# Patient Record
Sex: FEMALE | Race: BLACK OR AFRICAN AMERICAN | Employment: UNEMPLOYED | URBAN - METROPOLITAN AREA
[De-identification: names, ages, dates, MRNs, and addresses within clinical notes are randomized per-mention and may not be internally consistent; named-entity substitution may affect disease eponyms.]

---

## 2017-11-21 ENCOUNTER — OP HISTORICAL/CONVERTED ENCOUNTER (OUTPATIENT)
Dept: OTHER | Age: 37
End: 2017-11-21

## 2020-07-31 ENCOUNTER — OFFICE VISIT (OUTPATIENT)
Dept: ENDOCRINOLOGY | Age: 40
End: 2020-07-31

## 2020-07-31 VITALS
DIASTOLIC BLOOD PRESSURE: 62 MMHG | TEMPERATURE: 97.7 F | OXYGEN SATURATION: 97 % | SYSTOLIC BLOOD PRESSURE: 111 MMHG | HEART RATE: 90 BPM | HEIGHT: 64 IN | WEIGHT: 231 LBS | BODY MASS INDEX: 39.44 KG/M2 | RESPIRATION RATE: 16 BRPM

## 2020-07-31 DIAGNOSIS — E05.90 SUBCLINICAL HYPERTHYROIDISM: Primary | ICD-10-CM

## 2020-07-31 DIAGNOSIS — E66.9 NON MORBID OBESITY: ICD-10-CM

## 2020-07-31 RX ORDER — ASPIRIN 81 MG/1
81 TABLET ORAL AS NEEDED
COMMUNITY

## 2020-07-31 RX ORDER — AMLODIPINE BESYLATE 10 MG/1
TABLET ORAL
COMMUNITY
Start: 2020-05-17

## 2020-07-31 NOTE — LETTER
8/1/20 Patient: Colette Cooley YOB: 1980 Date of Visit: 7/31/2020 Bienvenido Montanez MD 
[address] [address] VIA Facsimile: [phone] Dear Bienvenido Montanez MD, Thank you for referring Ms. Lico Alas to [id number] [place] [address] for evaluation. My notes for this consultation are attached. If you have questions, please do not hesitate to call me. I look forward to following your patient along with you. Sincerely, Lynnette Mims MD

## 2020-07-31 NOTE — PROGRESS NOTES
Kathy Hansen MD      Patient Information  Date:8/1/2020  Name : Adriana Davidson 35 y.o.     YOB: 1980         Referred by: Amber Torres MD         Chief Complaint   Patient presents with   Fior.Minus New Patient     referred for Thyroid       History of present illness    Adriana Davidson is a 35 y.o. female      she is here for evaluation and management of abnormal thyroid function tests. she was found to have low TSH x 2 in June 2020. No prior history of thyroid disease. She might have been on multivitamins, biotin at that time  Intermittently has nervousness, but nothing consistently. No weight loss. No iodine exposure, steroid use, not on any thyroid supplements. No change in the size of the neck or neck pain. No dysphagia,dysphonia or dyspnea. No A fib or osteoporosis  No family history of thyroid disease or thyroid cancer      Past Medical History:   Diagnosis Date    Anxiety     Bipolar disorder (HCC)     Hyperlipidemia     Hypertension        Current Outpatient Medications   Medication Sig    amLODIPine (NORVASC) 10 mg tablet take 1 tablet by mouth once daily    aspirin delayed-release 81 mg tablet Take 81 mg by mouth as needed for Pain. No current facility-administered medications for this visit. Review of Systems:  All 10 systems reviewed and are negative other than mentioned in HPI      Physical Examination:  Blood pressure 111/62, pulse 90, temperature 97.7 °F (36.5 °C), temperature source Oral, resp. rate 16, height 5' 4\" (1.626 m), weight 231 lb (104.8 kg), SpO2 97 %. Body mass index is 39.65 kg/m².   - General: pleasant, no distress, good eye contact  - HEENT: no exopthalmos, no periorbital edema, no scleral/conjunctival injection, EOMI, no lid lag or stare  - Neck: supple, no thyromegaly, nodules, lymph nodes,   - Cardiovascular: regular,  normal S1 and S2, no murmurs  - Respiratory: clear to auscultation bilaterally  - Gastrointestinal: soft, nontender, nondistended, BS +  - Musculoskeletal: no proximal muscle weakness in upper or lower extremities  - Integumentary: no tremors, no edema  - Neurological: alert and oriented   - Psychiatric: normal mood and affect  - Skin - normal turgor    Data Reviewed:       [] Reviewed labs    Assessment/Plan:     1. Subclinical hyperthyroidism    2. Non morbid obesity          she is asymptomatic. Differentials are - Thyroiditis , lab variation , toxic nodule. Clinically could not appreciate goiter. We will repeat thyroid function tests including TSH and free T4 along with T3. No known history of atrial fibrillation, CVD, osteoporosis. Further tests based on blood tests. No indication to treat subclinical hyperthyroidism unless TSH is suppressed , history of atrial fibrillation or has osteoporosis. Nonmorbid obesity: Discussed about the lifestyle changes, dietary changes      Follow-up and Dispositions    · Return in about 6 weeks (around 9/11/2020). Thank you for allowing me to participate in the care of this patient. Haig Kocher, MD      Patient Zunilda Lovett verbalized understanding    Voice-recognition software was used to generate this report, which may result in some phonetic-based errors in the grammar and contents. Even though attempts were made to correct all the mistakes, some may have been missed and remained in the body of the report.

## 2020-07-31 NOTE — PROGRESS NOTES
Taylor Santamaria is a 35 y.o. female here for   Chief Complaint   Patient presents with    New Patient     referred for Thyroid       1. Have you been to the ER, urgent care clinic since your last visit? Hospitalized since your last visit? -n/a    2. Have you seen or consulted any other health care providers outside of the [address] [place] [address] since your last visit?   Include any pap smears or colon screening.-n/a

## 2020-09-04 DIAGNOSIS — E05.90 SUBCLINICAL HYPERTHYROIDISM: ICD-10-CM

## 2021-10-29 ENCOUNTER — TRANSCRIBE ORDER (OUTPATIENT)
Dept: SCHEDULING | Age: 41
End: 2021-10-29

## 2021-10-29 DIAGNOSIS — Z12.31 ENCOUNTER FOR SCREENING MAMMOGRAM FOR MALIGNANT NEOPLASM OF BREAST: Primary | ICD-10-CM

## 2021-11-03 ENCOUNTER — TRANSCRIBE ORDER (OUTPATIENT)
Dept: SCHEDULING | Age: 41
End: 2021-11-03

## 2021-11-03 DIAGNOSIS — Z12.31 ENCOUNTER FOR MAMMOGRAM TO ESTABLISH BASELINE MAMMOGRAM: Primary | ICD-10-CM

## 2021-11-05 ENCOUNTER — HOSPITAL ENCOUNTER (OUTPATIENT)
Dept: MAMMOGRAPHY | Age: 41
Discharge: HOME | End: 2021-11-05
Payer: COMMERCIAL

## 2021-11-05 DIAGNOSIS — Z12.31 ENCOUNTER FOR MAMMOGRAM TO ESTABLISH BASELINE MAMMOGRAM: ICD-10-CM

## 2021-11-05 PROCEDURE — 77063 BREAST TOMOSYNTHESIS BI: CPT

## 2022-11-16 ENCOUNTER — HOSPITAL ENCOUNTER (OUTPATIENT)
Dept: PHYSICAL THERAPY | Age: 42
Discharge: HOME | End: 2022-11-16
Payer: COMMERCIAL

## 2022-11-16 PROCEDURE — 97161 PT EVAL LOW COMPLEX 20 MIN: CPT

## 2022-11-16 NOTE — THERAPY EVALUATION
[address] [address] [facility] [address] [facility], [address] [place] [address]  Ph: [phone]    Fax: [phone]    Initial Evaluation/Plan of Care/Statement of Necessity for Physical Therapy Services     Patient name: Madie Garcia    Date/Start of Care:2022  : 1980  [x]  Patient  Verified  Provider#: [id number]          Referral source: Sydney Dean MD    Return visit to MD: In 30 days from last appt (?22)     Medical/Treatment Diagnosis: Neck pain [M54.2]  Radiculopathy, cervical region [M54.12]    Payor: [address] [place] [address] Road / Plan: [address] / Product Type: Managed Care Medicaid /       Prior Hospitalization: see medical history     Comorbidities: see intake summary   Prior Level of Function: Indep with ADLs/IADLs with off and on R neck pain   Medications: Verified on Patient Summary List          Patient / Family readiness to learn indicated by: asking questions, trying to perform skills, and interest  Persons(s) to be included in education: patient (P)  Barriers to Learning/Limitations: None  Patient Self Reported Health Status: poor  Rehabilitation Potential: excellent  Previous Treatment/Compliance: none   PMHx/Surgical Hx: see intake summary   Work Hx: [employer] joyce   Living Situation: Lives with sister and mom in an apartment on second level. Barriers to progress: none   Motivation: good   Substance use: Tobacco use - h.o. substance abuse   Cognition: A & O x 4   Onset Date: 10/28/22    SUBJECTIVE  Mechanism of Injury/History of Complaint: Pt states she went to the ER around 10/28 because of tingling in the R hand and pain throughout the R arm to the neck. Pt reports she was told that it could be due to the way she was sitting and resting her arm on chairs while playing games on her phone.   Pt was given some pain medication but she did not want to take it because she is taking Methadone and does not like to mix medications (recovering drug addict). Pt states she has not had any of the sxs for about a week now. She saw the Orthopedic on 11/11/22 and was told that she did not need to do anymore follow ups. Pt states she has had those sxs off and on for a while but it had never been as bad as it was recently. She was told to change how she was sitting and sleeping and she thinks that has helped a lot. Area of pain: R neck/UE     Pain Descriptors:  tingling and achiness    Pain Level (0-10 scale): Worst: 5   Least: 0  Things that worsen pain: putting pressure on the R elbow (sitting resting elbow on arm rest)    Things that ease pain: getting the pressure off of the arm; sleeping with new pillow (S-shape)  Objective/Functional Measures including ROM/MMT:   Physical Findings   Ortho:   Posture:  forward head, rounded shoulders   Palpation: TTP R UT/scalenes/pecs   Gait/Functional Mobility:  indep   Gross findings:  tightness grossly in lower cervical spine     Spine: *normal values in ()  CERVICAL                                                ROM                                  Strength   Flexion  50 5   Extension 50 4+   Protraction WNL     Retraction P! R side of neck        R L R L   Lateral Flexion (45) 25 P! On R  34 pulling R UT 4 5   Rotation (90) 65 67 tightness  4+ 4+   Additional comments:     Specific joints:   SHOULDER  - ROM and strength WNL - some tightness felt in the L shoulder with end range abduction and IR behind the back    Strength: L 69 lbs, R 79 lbs       Neurological:   Myotomes - normal   Dermatomes - normal   Reflexes/Sensations- intact to light touch     Special Tests:   +R spurling test   +median nerve test jesus  (-)radial ner test   (-)ulnar nerve test   +R tinnel test elbow       Ampac Score:   5.92%  ASSESSMENT/Changes in Function:   Pt referred to PT services with diagnoses of neck pain and cervical radiculopathy.   Initial sxs have been resolved for about one week now but pt still presents with some soft tissue tightness, decrease cervical ROM, postural changes, decrease scapula stabilization and upper nerve tension (median and ulnar bias). Pt will benefit from skilled services to improve performance with core stabilization and posture, spine flexibility and strength, increased awareness of neuromuscular control of cervical and scapular muscle activation, and to address impairments in order to meet functional goals. Problem List/Impairments: pain affecting function, decrease ROM, decrease strength, decrease activity tolerance, and decrease flexibility/ joint mobility  Treatment Plan may include any combination of the following: Therapeutic exercise, Neuromuscular reeducation, Manual therapy, Therapeutic activity, Electric stim unattended , Ultrasound, and Mechanical traction  Patient/ Caregiver education and instruction: activity modification and exercises  Frequency / Duration: Patient to be seen 1-2 times per week for 8-12 treatments. Certification Period: 11/16/22 - 2/20/23  Patient Goal (s): Vira Joaquin what to do to prevent pain from returning.     [] Met [] Not met [] Partially met   Short Term Goals: To be accomplished in 4-6 treatments. Indep with a HEP to assist in rehab progression. [] Met [] Not met [] Partially met    5-10 deg improvement in cervical lat flexion and rotation to improve neck mobility. [] Met [] Not met [] Partially met   Pt will report no sxs into the R UE for centralization of neck pain. [] Met [] Not met [] Partially met    Long Term Goals: To be accomplished in 8-12 treatments. Pt will be able to perform daily activities without R neck/UE sxs for return to PLOF.  [] Met [] Not met [] Partially met    TODAY'S TREATMENT: EVALUATION completed                  EVALUATION COMPLEXITY (Low, Moderate, High): low  Visit #: 1/8-12  In time:1:15pm   Out time:2:02pm  Total Treatment Time (min): 47   Pain Level (0-10 scale) pre treatment: 0   Pain Level (0-10 scale) post treatment: 0   With   [x] TE   [] TA   [] Neuro   [] SC   [] other: Patient Education: [x] Review HEP  with handout   [] Progressed/Changed HEP based on:   [] positioning   [] body mechanics   [] transfers   [x] heat/ice application    [x] other: Discussed POC        [x]  Plan of care has been reviewed with PTA. The Plan of Care is based on information from the initial evaluation. Kalli Baker, PT, DPT    11/16/2022   ________________________________________________________________________    I certify that the above Therapy Services are being furnished while the patient is under my care. I agree with the treatment plan and certify that this therapy is necessary.     Physician's Signature:_________________________________________________  Date:____________Time: ____________     Radha Richard MD

## 2022-12-07 ENCOUNTER — HOSPITAL ENCOUNTER (OUTPATIENT)
Dept: PHYSICAL THERAPY | Age: 42
Discharge: HOME | End: 2022-12-07
Payer: COMMERCIAL

## 2022-12-07 PROCEDURE — 97110 THERAPEUTIC EXERCISES: CPT

## 2022-12-07 PROCEDURE — 97140 MANUAL THERAPY 1/> REGIONS: CPT

## 2022-12-07 NOTE — PROGRESS NOTES
PT DAILY TREATMENT NOTE     Patient Name: Henry Leroy  Date:2022  : 1980  [x]  Patient  Verified  Payor: [address] [place] [address] Road / Plan: [address] / Product Type: Managed Care Medicaid /    Treatment Area: Neck pain [M54.2]  Radiculopathy, cervical region [M54.12]   Next MD APPT: In    In time:1:05pm    Out time: 2:05pm  Total Treatment Time (min): 60  Visit #: -    SUBJECTIVE    Any medication changes, allergies to medications, adverse drug reactions, diagnosis change, or new procedure performed?:   [x] No    [] Yes (see summary sheet for update)    Pain Level (0-10 scale) pre treatment: 0/10 neck; 1/10 R hand tingling  Pain Level (0-10 scale) post treatment: 0/10    Subjective functional status/changes:   [] No changes reported  Pt states she has had the R hand tingling off and on. Feeling some today but thinks it is from the weather. Pt states she only feels neck pain when sleeping \"the wrong way. \"    OBJECTIVE  Modality rationale: decrease pain and increase tissue extensibility to improve the patients ability to move her head without neck pain      Min Type Additional Details      15 []  Ice     [x]  Heat  []  Ice massage Position: supine   Location:neck      [x] Skin assessment post-treatment:   [x]intact  []redness- no adverse reaction   []redness - adverse reaction:      30 min Therapeutic Exercise:  [x] See flow sheet :   Rationale: increase ROM to improve the patients ability to perform daily tasks without neck pain or R hand parathesia   15 min Manual Therapy: STM lower cervical region     Rationale: decrease pain, increase ROM, increase tissue extensibility, and decrease trigger points to improve the patients ability to perform daily tasks without neck pain or R hand parathesia       With   [x] TE   [] TA   [] Neuro   [] SC   [] other: Patient Education: [x] Review HEP    [] Progressed/Changed HEP based on:   [] positioning   [] body mechanics   [] transfers   [x] Use of heat/ice    [] other:          Other Objective/Functional Measures:Initiated POC in clinic     ASSESSMENT/Changes in Function:   Pt responded well to tx with no pain or tingling at end of session. Noted neural tension and tightness during ulnar nerve glides on R. Soft tissue tightness in Uts, R scalenes and cervical paraspinals. Will continue current POC. Patient will continue to benefit from skilled PT services to modify and progress therapeutic interventions, address functional mobility deficits, address ROM deficits, address strength deficits, analyze and address soft tissue restrictions, analyze and cue movement patterns, and assess and modify postural abnormalities to attain remaining goals. GOALS/Progress towards goals:  Patient Goal (s): learn what to do to prevent pain from returning.     [] Met [] Not met [] Partially met     Short Term Goals: To be accomplished in 4-6 treatments. Indep with a HEP to assist in rehab progression. [] Met [] Not met [] Partially met    5-10 deg improvement in cervical lat flexion and rotation to improve neck mobility. [] Met [] Not met [] Partially met   Pt will report no sxs into the R UE for centralization of neck pain. [] Met [] Not met [] Partially met      Long Term Goals: To be accomplished in 8-12 treatments. Pt will be able to perform daily activities without R neck/UE sxs for return to PLOF.  [] Met [] Not met [] Partially met      PLAN  [x]  Continue plan of care  [x]  Upgrade activities as tolerated       [x]  Update interventions per flow sheet       []  Discharge due to:  []  Other:     Willie Eason, PT, DPT 12/7/2022

## 2022-12-09 ENCOUNTER — HOSPITAL ENCOUNTER (OUTPATIENT)
Dept: PHYSICAL THERAPY | Age: 42
Discharge: HOME | End: 2022-12-09
Payer: COMMERCIAL

## 2022-12-09 PROCEDURE — 97110 THERAPEUTIC EXERCISES: CPT

## 2022-12-09 NOTE — PROGRESS NOTES
PT DAILY TREATMENT NOTE     Patient Name: Pool Sahu  Date:2022  : 1980  [x]  Patient  Verified  Payor: [address] [place] [address] Road / Plan: Nettie Yusuf / Product Type: Managed Care Medicaid /    Treatment Area: Neck pain [M54.2]  Radiculopathy, cervical region [M54.12]   Next MD APPT: In    In time:1:00 pm    Out time:2:02 pm  Total Treatment Time (min): 60 min  Visit #:     SUBJECTIVE    Any medication changes, allergies to medications, adverse drug reactions, diagnosis change, or new procedure performed?:   [x] No    [] Yes (see summary sheet for update)    Pain Level (0-10 scale) pre treatment: 2/10 neck;   Pain Level (0-10 scale) post treatment: 0/10    Subjective functional status/changes:   [] No changes reported  Pt stated she  had chest pain  after last session . Informed her corner stretch can make her sore in her chest area. She reports no N/T in R hand today.         OBJECTIVE  Modality rationale: decrease pain and increase tissue extensibility to improve the patients ability to move her head without neck pain      Min Type Additional Details      15 []  Ice     [x]  Heat  []  Ice massage Position: supine   Location:neck      [x] Skin assessment post-treatment:   [x]intact  []redness- no adverse reaction   []redness - adverse reaction:      45 min Therapeutic Exercise:  [x] See flow sheet :   Rationale: increase ROM to improve the patients ability to perform daily tasks without neck pain or R hand parathesia    min Manual Therapy: STM lower cervical region     Rationale: decrease pain, increase ROM, increase tissue extensibility, and decrease trigger points to improve the patients ability to perform daily tasks without neck pain or R hand parathesia       With   [x] TE   [] TA   [] Neuro   [] SC   [] other: Patient Education: [x] Review HEP    [] Progressed/Changed HEP based on:   [] positioning   [] body mechanics   [] transfers   [] Use of heat/ice    [] other: Other Objective/Functional Measures:Initiated POC in clinic     ASSESSMENT/Changes in Function:   Patient reported no N/T in R UE today pre and post treatment session today. She was able to tolerate new exercises with no complaints of discomfort or pain. Discussed with patient to stay in pain free range and avoid overstretching. Decrease in pain post treatment session. Patient will continue to benefit from skilled PT services to modify and progress therapeutic interventions, address functional mobility deficits, address ROM deficits, address strength deficits, analyze and address soft tissue restrictions, analyze and cue movement patterns, and assess and modify postural abnormalities to attain remaining goals. GOALS/Progress towards goals:  Patient Goal (s): learn what to do to prevent pain from returning.     [] Met [] Not met [] Partially met     Short Term Goals: To be accomplished in 4-6 treatments. Indep with a HEP to assist in rehab progression. [x] Met [] Not met [] Partially met    5-10 deg improvement in cervical lat flexion and rotation to improve neck mobility. [] Met [] Not met [] Partially met   Pt will report no sxs into the R UE for centralization of neck pain. [] Met [] Not met [] Partially met      Long Term Goals: To be accomplished in 8-12 treatments. Pt will be able to perform daily activities without R neck/UE sxs for return to PLOF. [] Met [] Not met [] Partially met      Treatment Plan may include any combination of the following: Therapeutic exercise, Neuromuscular reeducation, Manual therapy, Therapeutic activity, Electric stim unattended , Ultrasound, and Mechanical traction    Frequency / Duration: Patient to be seen 1-2 times per week for 8-12 treatments.     Certification Period: 11/16/22 - 2/20/23    PLAN  [x]  Continue plan of care  [x]  Upgrade activities as tolerated       [x]  Update interventions per flow sheet       []  Discharge due to:  []  Other: Ellen Sacramento, PTA 12/9/2022

## 2022-12-13 ENCOUNTER — HOSPITAL ENCOUNTER (OUTPATIENT)
Dept: PHYSICAL THERAPY | Age: 42
Discharge: HOME | End: 2022-12-13
Payer: COMMERCIAL

## 2022-12-13 PROCEDURE — 97110 THERAPEUTIC EXERCISES: CPT

## 2022-12-13 PROCEDURE — 97140 MANUAL THERAPY 1/> REGIONS: CPT

## 2022-12-13 NOTE — PROGRESS NOTES
PT DAILY TREATMENT NOTE     Patient Name: Pool Sahu  Date:2022  : 1980  [x]  Patient  Verified  Payor: [address] [place] [address] Road / Plan: [address] / Product Type: Managed Care Medicaid /    Treatment Area: Neck pain [M54.2]  Radiculopathy, cervical region [M54.12]   Next MD APPT: In    In time:2:50  pm    Out time:3:50  pm  Total Treatment Time (min): 60 min  Visit #: -    SUBJECTIVE    Any medication changes, allergies to medications, adverse drug reactions, diagnosis change, or new procedure performed?:   [x] No    [] Yes (see summary sheet for update)    Pain Level (0-10 scale) pre treatment: 6/10 neck  Pain Level (0-10 scale) post treatment: 0/10    Subjective functional status/changes:   [] No changes reported  Reports more pain today and she attribute it to the cold weather. Stated her neck and shoulder are hurting.        OBJECTIVE  Modality rationale: decrease pain and increase tissue extensibility to improve the patients ability to move her head without neck pain      Min Type Additional Details      15 []  Ice     [x]  Heat  []  Ice massage Position: supine   Location:neck      [x] Skin assessment post-treatment:   [x]intact  []redness- no adverse reaction   []redness - adverse reaction:      30 min Therapeutic Exercise:  [x] See flow sheet :   Rationale: increase ROM to improve the patients ability to perform daily tasks without neck pain or R hand parathesia   10 min Manual Therapy: STM lower cervical region     Rationale: decrease pain, increase ROM, increase tissue extensibility, and decrease trigger points to improve the patients ability to perform daily tasks without neck pain or R hand parathesia       With   [x] TE   [] TA   [] Neuro   [] SC   [] other: Patient Education: [x] Review HEP    [] Progressed/Changed HEP based on:   [] positioning   [] body mechanics   [] transfers   [] Use of heat/ice    [] other:          Other Objective/Functional Measures:  Reports more pain on R UT/levator scalene area and L shoulder . ASSESSMENT/Changes in Function:   Tolerated session well we focus on AROM and active/passive stretches. Pt presents with more R UT/levator tightness today. We also did some STM to B shoulder/cervical spine and finish up with heat. Pt reports no pain post session. Patient will continue to benefit from skilled PT services to modify and progress therapeutic interventions, address functional mobility deficits, address ROM deficits, address strength deficits, analyze and address soft tissue restrictions, analyze and cue movement patterns, and assess and modify postural abnormalities to attain remaining goals. GOALS/Progress towards goals:  Patient Goal (s): learn what to do to prevent pain from returning.     [] Met [] Not met [] Partially met     Short Term Goals: To be accomplished in 4-6 treatments. Indep with a HEP to assist in rehab progression. [x] Met [] Not met [] Partially met    5-10 deg improvement in cervical lat flexion and rotation to improve neck mobility. [] Met [] Not met [] Partially met   Pt will report no sxs into the R UE for centralization of neck pain. [] Met [] Not met [] Partially met      Long Term Goals: To be accomplished in 8-12 treatments. Pt will be able to perform daily activities without R neck/UE sxs for return to PLOF. [] Met [] Not met [] Partially met      Treatment Plan may include any combination of the following: Therapeutic exercise, Neuromuscular reeducation, Manual therapy, Therapeutic activity, Electric stim unattended , Ultrasound, and Mechanical traction    Frequency / Duration: Patient to be seen 1-2 times per week for 8-12 treatments.     Certification Period: 11/16/22 - 2/20/23    PLAN  [x]  Continue plan of care  [x]  Upgrade activities as tolerated       [x]  Update interventions per flow sheet       []  Discharge due to:  []  Other:     Dc Torres, PT, DPT 12/13/2022

## 2022-12-15 ENCOUNTER — HOSPITAL ENCOUNTER (OUTPATIENT)
Dept: PHYSICAL THERAPY | Age: 42
Discharge: HOME | End: 2022-12-15
Payer: COMMERCIAL

## 2022-12-15 PROCEDURE — 97140 MANUAL THERAPY 1/> REGIONS: CPT | Performed by: PHYSICAL THERAPIST

## 2022-12-15 PROCEDURE — 97110 THERAPEUTIC EXERCISES: CPT | Performed by: PHYSICAL THERAPIST

## 2022-12-15 NOTE — PROGRESS NOTES
PT DAILY TREATMENT NOTE     Patient Name: Alok Court  Date:12/15/2022  : 1980  [x]  Patient  Verified  Payor: [address] [place] [address] Road / Plan: [address] / Product Type: Managed Care Medicaid /    Treatment Area: Neck pain [M54.2]  Radiculopathy, cervical region [M54.12]   Next MD APPT: In    In time:145 pm    Out time: 240  pm  Total Treatment Time (min): 55  Total Timed codes (Min): 39  Visit #: -    SUBJECTIVE    Any medication changes, allergies to medications, adverse drug reactions, diagnosis change, or new procedure performed?:   [x] No    [] Yes (see summary sheet for update)    Pain Level (0-10 scale) pre treatment: 7/10 L UT, 2/10 tingling in hands  Pain Level (0-10 scale) post treatment: 0/10    Subjective functional status/changes:   [] No changes reported  Reports that she has a knot and a lot of pain in L UT since previous PT visit. Unsure if any specific exercises increased pain. Pt admits she's had a lot of stress in life and has not been performing HEP at all.        OBJECTIVE  Modality rationale: decrease pain and increase tissue extensibility to improve the patients ability to move her head without neck pain      Min Type Additional Details      10 []  Ice     [x]  Heat  []  Ice massage Position: supine   Location:neck      [x] Skin assessment post-treatment:   [x]intact  []redness- no adverse reaction   []redness - adverse reaction:      33 min Therapeutic Exercise:  [x] See flow sheet : manual UT, LS, and pectoral stretch in supine   Rationale: increase ROM to improve the patients ability to perform daily tasks without neck pain or R hand parathesia     12 min Manual Therapy: STM L UT/LS, suboccipital release, cervical distraction   Rationale: decrease pain, increase ROM, increase tissue extensibility, and decrease trigger points to improve the patients ability to perform daily tasks without neck pain or R hand parathesia       With   [x] TE   [] TA   [] Neuro   [] SC   [] other: Patient Education: [x] Review HEP    [] Progressed/Changed HEP based on:   [] positioning   [] body mechanics   [] transfers   [] Use of heat/ice    [x] other: provided with updated HEP handout         Other Objective/Functional Measures:  TTP L UT/LS. Noted reduction of tingling in bilateral hands with manual cervical distraction and suboccipital release    ASSESSMENT/Changes in Function:   Pt participated well with skilled PT services noting resolution of symptoms with treatment. Pt education provided on the importance of proper postural alignment and to avoid elevation of shoulder to help reduced UT tension. Patient will continue to benefit from skilled PT services to modify and progress therapeutic interventions, address functional mobility deficits, address ROM deficits, address strength deficits, analyze and address soft tissue restrictions, analyze and cue movement patterns, and assess and modify postural abnormalities to attain remaining goals. GOALS/Progress towards goals:  Patient Goal (s): learn what to do to prevent pain from returning.     [] Met [] Not met [] Partially met     Short Term Goals: To be accomplished in 4-6 treatments. Indep with a HEP to assist in rehab progression. [x] Met [] Not met [] Partially met    5-10 deg improvement in cervical lat flexion and rotation to improve neck mobility. [] Met [] Not met [] Partially met   Pt will report no sxs into the R UE for centralization of neck pain. [] Met [] Not met [] Partially met      Long Term Goals: To be accomplished in 8-12 treatments. Pt will be able to perform daily activities without R neck/UE sxs for return to PLOF.  [] Met [] Not met [] Partially met      Treatment Plan may include any combination of the following: Therapeutic exercise, Neuromuscular reeducation, Manual therapy, Therapeutic activity, Electric stim unattended , Ultrasound, and Mechanical traction    Frequency / Duration: Patient to be seen 1-2 times per week for 8-12 treatments.     Certification Period: 11/16/22 - 2/20/23    PLAN  [x]  Continue plan of care  [x]  Upgrade activities as tolerated       [x]  Update interventions per flow sheet       []  Discharge due to:  []  Other:     Loida Newton, PT 12/15/2022

## 2022-12-19 ENCOUNTER — HOSPITAL ENCOUNTER (OUTPATIENT)
Dept: PHYSICAL THERAPY | Age: 42
Discharge: HOME | End: 2022-12-19
Payer: COMMERCIAL

## 2022-12-19 PROCEDURE — 97110 THERAPEUTIC EXERCISES: CPT

## 2022-12-19 PROCEDURE — 97140 MANUAL THERAPY 1/> REGIONS: CPT

## 2022-12-19 NOTE — PROGRESS NOTES
PT DAILY TREATMENT NOTE     Patient Name: Darleene Primrose  Date:2022  : 1980  [x]  Patient  Verified  Payor: [address] [place] [address] Road / Plan: [address] / Product Type: Managed Care Medicaid /    Treatment Area: Neck pain [M54.2]  Radiculopathy, cervical region [M54.12]   Next MD APPT: In    In time:1:55 pm    Out time: 2:53  pm  Total Treatment Time (min): 55  Total Timed codes (Min): 40  Visit #: -    SUBJECTIVE    Any medication changes, allergies to medications, adverse drug reactions, diagnosis change, or new procedure performed?:   [x] No    [] Yes (see summary sheet for update)    Pain Level (0-10 scale) pre treatment: 5/10 L   Pain Level (0-10 scale) post treatment: 3/10    Subjective functional status/changes:   [] No changes reported  Reports the N/T is not present today, but comes and goes now. She is complaining more of posterior shoulder pain.       OBJECTIVE  Modality rationale: decrease pain and increase tissue extensibility to improve the patients ability to move her head without neck pain      Min Type Additional Details      15 []  Ice     [x]  Heat  []  Ice massage Position: supine   Location:neck      [x] Skin assessment post-treatment:   [x]intact  []redness- no adverse reaction   []redness - adverse reaction:      30 min Therapeutic Exercise:  [x] See flow sheet : manual UT, LS, and pectoral stretch in supine   Rationale: increase ROM to improve the patients ability to perform daily tasks without neck pain or R hand parathesia     10 min Manual Therapy: STM L UT/LS, suboccipital release, cervical distraction   Rationale: decrease pain, increase ROM, increase tissue extensibility, and decrease trigger points to improve the patients ability to perform daily tasks without neck pain or R hand parathesia       With   [x] TE   [] TA   [] Neuro   [] SC   [] other: Patient Education: [x] Review HEP    [] Progressed/Changed HEP based on:   [] positioning [] body mechanics   [] transfers   [] Use of heat/ice    [] other: provided with updated HEP handout         Other Objective/Functional Measures:  No N/T , continued with MT and added Taping to R UT area. ASSESSMENT/Changes in Function: Patient had no N/T today but did have posterior cap pain to R shoulder. Taped R UT today to help decrease pain . Patient reporting N/T intermittent day to day. Will reassess on next visit . Did discuss when and how to remove tape. Also discussed using ice at home should pain increase. Decrease noted post treatment session. Patient will continue to benefit from skilled PT services to modify and progress therapeutic interventions, address functional mobility deficits, address ROM deficits, address strength deficits, analyze and address soft tissue restrictions, analyze and cue movement patterns, and assess and modify postural abnormalities to attain remaining goals. GOALS/Progress towards goals:  Patient Goal (s): learn what to do to prevent pain from returning.     [] Met [] Not met [] Partially met     Short Term Goals: To be accomplished in 4-6 treatments. Indep with a HEP to assist in rehab progression. [x] Met [] Not met [] Partially met    5-10 deg improvement in cervical lat flexion and rotation to improve neck mobility. [] Met [] Not met [] Partially met   Pt will report no sxs into the R UE for centralization of neck pain. [] Met [] Not met [] Partially met      Long Term Goals: To be accomplished in 8-12 treatments. Pt will be able to perform daily activities without R neck/UE sxs for return to PLOF. [] Met [] Not met [] Partially met      Treatment Plan may include any combination of the following: Therapeutic exercise, Neuromuscular reeducation, Manual therapy, Therapeutic activity, Electric stim unattended , Ultrasound, and Mechanical traction    Frequency / Duration: Patient to be seen 1-2 times per week for 8-12 treatments.     Certification Period: 11/16/22 - 2/20/23    PLAN  [x]  Continue plan of care  [x]  Upgrade activities as tolerated       [x]  Update interventions per flow sheet       []  Discharge due to:  []  Other:     Ellen Cheng, PTA 12/19/2022

## 2022-12-21 ENCOUNTER — HOSPITAL ENCOUNTER (OUTPATIENT)
Dept: PHYSICAL THERAPY | Age: 42
Discharge: HOME | End: 2022-12-21
Payer: COMMERCIAL

## 2022-12-21 PROCEDURE — 97110 THERAPEUTIC EXERCISES: CPT

## 2022-12-21 PROCEDURE — 97140 MANUAL THERAPY 1/> REGIONS: CPT

## 2022-12-21 NOTE — PROGRESS NOTES
PT DAILY TREATMENT NOTE     Patient Name: Rajinder Sidhu  Date:2022  : 1980  [x]  Patient  Verified  Payor: [address] [place] [address] Road / Plan: [address] / Product Type: Managed Care Medicaid /    Treatment Area: Neck pain [M54.2]  Radiculopathy, cervical region [M54.12]   Next MD APPT: In    In time:1:58 pm    Out time: 02:48  pm  Total Treatment Time (min): 45 min  Total Timed codes (Min): 30  Visit #: -    SUBJECTIVE    Any medication changes, allergies to medications, adverse drug reactions, diagnosis change, or new procedure performed?:   [x] No    [] Yes (see summary sheet for update)    Pain Level (0-10 scale) pre treatment: 0/10   Pain Level (0-10 scale) post treatment: 0/10    Subjective functional status/changes:   [] No changes reported  Patient came in 15 min late for her appointment and modified her treatment to available time . Reports the N/T is not present today, but comes and goes now. She reports the N/T go down B UE now. She also states her pain has centralized to her cervical spine . She reports 75% improvement with pain at worse 5/10 and best 0/10. Patient did report tape helped with decreasing pain. Patient also stated she has returned to her PLOF with her ADL's.     OBJECTIVE  Modality rationale: decrease pain and increase tissue extensibility to improve the patients ability to move her head without neck pain      Min Type Additional Details      15 []  Ice     [x]  Heat  []  Ice massage Position: supine   Location:neck      [x] Skin assessment post-treatment:   [x]intact  []redness- no adverse reaction   []redness - adverse reaction:      20 min Therapeutic Exercise:  [x] See flow sheet : manual UT, LS, and pectoral stretch in supine   Rationale: increase ROM to improve the patients ability to perform daily tasks without neck pain or R hand parathesia     10 min Manual Therapy: STM L UT/LS, suboccipital release, cervical distraction   Rationale: decrease pain, increase ROM, increase tissue extensibility, and decrease trigger points to improve the patients ability to perform daily tasks without neck pain or R hand parathesia       With   [x] TE   [] TA   [] Neuro   [] SC   [] other: Patient Education: [x] Review HEP    [] Progressed/Changed HEP based on:   [] positioning   [] body mechanics   [] transfers   [] Use of heat/ice    [] other: provided with updated HEP handout         Other Objective/Functional Measures:  Physical Findings   Ortho:   Posture:  forward head, rounded shoulders   Gait/Functional Mobility:  indep      Spine: *normal values in ()  CERVICAL                                                ROM                                  Strength        Flexion  WNL 5   Extension 60 5        R L R L   Lateral Flexion (45) 45 P! 45 4+ 5   Rotation (90) 65 P! 70 5 5   Additional comments:      Specific joints:   SHOULDER                                         AROM   PROM            MMT   R L R L R L   Flexion (180) WNL WNL   5 5   Extension (60)         Abduction (180) WNL WNL   5 5   Adduction (0)         IR (70) WNL WNL   5 5   ER (90) WNL WNL   5 5   Additional comments:      Strength:  R  80 lbs,  L 58 lbs      ( initial L 69 lbs, R 79 lbs )        Pt. R hand dominant                                Ampac Score:  0%  ( initial 5.92% )    ASSESSMENT/Changes in Function:   Patient came in 15 min late for her appointment and modified her treatment to available time . Patient has been seen a total of 7 times since receiving therapy. Reports the N/T is not present today, but comes and goes now. She reports the N/T go down B UE now. She also states her pain has centralized to her cervical spine . She reports 75% improvement with pain at worse 5/10 and best 0/10. Patient did report tape helped with decreasing pain. Patient also stated she has returned to her PLOF with her ADL's. She is progressing well with goals meeting or partially meeting all goals. She still has some restrictions with cervical ROM , especially rotation. The last 2 times coming into therapy she did not have any radicular symptoms. Improvement with AM-Pac and  strength. Patient will continue to benefit from skilled PT services to modify and progress therapeutic interventions, address functional mobility deficits, address ROM deficits, address strength deficits, analyze and address soft tissue restrictions, analyze and cue movement patterns, and assess and modify postural abnormalities to attain remaining goals. GOALS/Progress towards goals:  Patient Goal (s): learn what to do to prevent pain from returning.     [] Met [] Not met [x] Partially met     Short Term Goals: To be accomplished in 4-6 treatments. Indep with a HEP to assist in rehab progression. [x] Met [] Not met [] Partially met    5-10 deg improvement in cervical lat flexion and rotation to improve neck mobility. [] Met [] Not met [x] Partially met 12/21 Lateral flexion improved  Pt will report no sxs into the R UE for centralization of neck pain. [] Met [] Not met [x] Partially met  12/21 intermittent to B UE    Long Term Goals: To be accomplished in 8-12 treatments. Pt will be able to perform daily activities without R neck/UE sxs for return to PLOF. [x] Met [] Not met [] Partially met      Treatment Plan may include any combination of the following: Therapeutic exercise, Neuromuscular reeducation, Manual therapy, Therapeutic activity, Electric stim unattended , Ultrasound, and Mechanical traction    Frequency / Duration: Patient to be seen 1-2 times per week for 8-12 treatments.     Certification Period: 11/16/22 - 2/20/23    PLAN  [x]  Continue plan of care  [x]  Upgrade activities as tolerated       [x]  Update interventions per flow sheet       []  Discharge due to:  []  Other:     Ellen [place], PTA 12/21/2022

## 2022-12-21 NOTE — PROGRESS NOTES
[address]  Ph: [phone]    Fax: [phone]  Therapy Progress Report  Name: Candelario Yap  : 1980   MD: Tamiko Myles MD     Medical Diagnosis: Neck pain [M54.2]  Radiculopathy, cervical region [M54.12]  Start of Care: 22    Visits from Start of Care: 7     Missed Visits: -  Certification Period: 22 - 22    Frequency/Duration: 2 times a week for 8-12 treatments     Summary of Care/Goals:  ASSESSMENT/Changes in Function:   Patient has been seen a total of 7 times since receiving therapy. Reports the N/T is not present today, but comes and goes now. She reports the N/T go down B UE now. She also states her pain has centralized to her cervical spine . She reports 75% improvement with pain at worse 5/10 and best 0/10. Patient did report tape helped with decreasing pain. Patient also stated she has returned to her PLOF with her ADL's. She is progressing well with goals meeting or partially meeting all goals. She still has some restrictions with cervical ROM , especially rotation. The last 2 times coming into therapy she did not have any radicular symptoms. Improvement with AM-Pac and  strength. Patient will continue to benefit from skilled PT services to modify and progress therapeutic interventions, address functional mobility deficits, address ROM deficits, address strength deficits, analyze and address soft tissue restrictions, analyze and cue movement patterns, and assess and modify postural abnormalities to attain remaining goals. GOALS/Progress towards goals:  Patient Goal (s): learn what to do to prevent pain from returning.     [] Met [] Not met [x] Partially met      Short Term Goals: To be accomplished in 4-6 treatments. Indep with a HEP to assist in rehab progression.  [x] Met [] Not met [] Partially met    5-10 deg improvement in cervical lat flexion and rotation to improve neck mobility. [] Met [] Not met [x] Partially met 12/21 Lateral flexion improved  Pt will report no sxs into the R UE for centralization of neck pain. [] Met [] Not met [x] Partially met  12/21 intermittent to B UE     Long Term Goals: To be accomplished in 8-12 treatments. Pt will be able to perform daily activities without R neck/UE sxs for return to PLOF. [x] Met [] Not met [] Partially met     _____________________________________________       SUBJECTIVE     Any medication changes, allergies to medications, adverse drug reactions, diagnosis change, or new procedure performed?:   [x] No    [] Yes (see summary sheet for update)     Pain Level (0-10 scale) pre treatment: 0/10   Pain Level (0-10 scale) post treatment: 0/10     Subjective functional status/changes:   [] No changes reported  Patient came in 15 min late for her appointment and modified her treatment to available time . Reports the N/T is not present today, but comes and goes now. She reports the N/T go down B UE now. She also states her pain has centralized to her cervical spine . She reports 75% improvement with pain at worse 5/10 and best 0/10. Patient did report tape helped with decreasing pain. Patient also stated she has returned to her PLOF with her ADL's.      OBJECTIVE                              Other Objective/Functional Measures:  Physical Findings   Ortho:   Posture:  forward head, rounded shoulders   Gait/Functional Mobility:  indep      Spine: *normal values in ()  CERVICAL                                                ROM                                  Strength        Flexion  WNL 5   Extension 60 5        R L R L   Lateral Flexion (45) 45 P! 45 4+ 5   Rotation (90) 65 P! 70 5 5   Additional comments:      Specific joints:   SHOULDER                                         AROM                        PROM                       MMT    R L R L R L   Flexion (180) WNL WNL     5 5   Extension (60)               Abduction (180) WNL WNL     5 5   Adduction (0)               IR (70) WNL WNL     5 5   ER (90) WNL WNL     5 5   Additional comments:       Strength:  R  80 lbs,  L 58 lbs      ( initial L 69 lbs, R 79 lbs )        Pt. R hand dominant                                 Ampac Score:  0%  ( initial 5.92% )  Recommendations: continue current POC   [x]  Plan of care has been reviewed with PTA. A physical therapist assistant contributed to this documentation. Kalli Carrillo, PT, DPT 12/21/2022     ________________________________________________________________________     Please retain this original for your records.

## 2022-12-27 ENCOUNTER — HOSPITAL ENCOUNTER (OUTPATIENT)
Dept: PHYSICAL THERAPY | Age: 42
End: 2022-12-27
Payer: COMMERCIAL

## 2022-12-27 PROCEDURE — 97140 MANUAL THERAPY 1/> REGIONS: CPT

## 2022-12-27 PROCEDURE — 97110 THERAPEUTIC EXERCISES: CPT

## 2022-12-27 NOTE — PROGRESS NOTES
PT DAILY TREATMENT NOTE     Patient Name: Azael Bowen  Date:2022  : 1980  [x]  Patient  Verified  Payor: [address] [place] [address] Road / Plan: [address] / Product Type: Managed Care Medicaid /    Treatment Area: Neck pain [M54.2]  Radiculopathy, cervical region [M54.12]   Next MD APPT: In    In time:10:45am    Out time: 11:30am  Total Treatment Time (min): 45  Total Timed codes (Min): 45  Visit #: -    SUBJECTIVE    Any medication changes, allergies to medications, adverse drug reactions, diagnosis change, or new procedure performed?:   [x] No    [] Yes (see summary sheet for update)    Pain Level (0-10 scale) pre treatment: 0/10    Pain Level (0-10 scale) post treatment: 3/10    Subjective functional status/changes:   [] No changes reported  Reports the N/T is not present today.  No pain reports      OBJECTIVE  Modality rationale: decrease pain and increase tissue extensibility to improve the patients ability to move her head without neck pain      Min Type Additional Details      10 []  Ice     [x]  Heat simultaneous with MT  []  Ice massage Position: supine   Location:neck      [x] Skin assessment post-treatment:   [x]intact  []redness- no adverse reaction   []redness - adverse reaction:      35 min Therapeutic Exercise:  [x] See flow sheet : 3 way ISOmetric cs retraction, PNF stretch during sh shrugs, manual UT, LS, and pectoral stretch in supine   Rationale: increase ROM to improve the patients ability to perform daily tasks without neck pain or R hand parathesia     10 min Manual Therapy: STM L UT/LS, suboccipital release, PNF stretch during sh shrugs, manual UT, LS, and pectoral stretch in supine   Rationale: decrease pain, increase ROM, increase tissue extensibility, and decrease trigger points to improve the patients ability to perform daily tasks without neck pain or R hand parathesia       With   [x] TE   [] TA   [] Neuro   [] SC   [] other: Patient Education: [x] Review HEP    [] Progressed/Changed HEP based on:   [] positioning   [] body mechanics   [] transfers   [] Use of heat/ice    [] other: provided with updated HEP handout         Other Objective/Functional Measures:  PNF contract relax no pain noted. ASSESSMENT/Changes in Function:   Pt presented good PROM lateral flexion and cs rotation however AROM still presents retricted ROM. Decrease noted post treatment session. Patient will continue to benefit from skilled PT services to modify and progress therapeutic interventions, address functional mobility deficits, address ROM deficits, address strength deficits, analyze and address soft tissue restrictions, analyze and cue movement patterns, and assess and modify postural abnormalities to attain remaining goals. GOALS/Progress towards goals:  Patient Goal (s): learn what to do to prevent pain from returning.     [] Met [] Not met [] Partially met     Short Term Goals: To be accomplished in 4-6 treatments. Indep with a HEP to assist in rehab progression. [x] Met [] Not met [] Partially met    5-10 deg improvement in cervical lat flexion and rotation to improve neck mobility. [] Met [] Not met [] Partially met   Pt will report no sxs into the R UE for centralization of neck pain. [] Met [] Not met [] Partially met      Long Term Goals: To be accomplished in 8-12 treatments. Pt will be able to perform daily activities without R neck/UE sxs for return to PLOF. [] Met [] Not met [] Partially met      Treatment Plan may include any combination of the following: Therapeutic exercise, Neuromuscular reeducation, Manual therapy, Therapeutic activity, Electric stim unattended , Ultrasound, and Mechanical traction    Frequency / Duration: Patient to be seen 1-2 times per week for 8-12 treatments.     Certification Period: 11/16/22 - 2/20/23    PLAN  [x]  Continue plan of care  [x]  Upgrade activities as tolerated       [x]  Update interventions per flow sheet       []  Discharge due to:  []  Other:     Doroteo De Anda, PTA 12/27/2022

## 2022-12-29 ENCOUNTER — HOSPITAL ENCOUNTER (OUTPATIENT)
Dept: PHYSICAL THERAPY | Age: 42
End: 2022-12-29
Payer: COMMERCIAL

## 2022-12-29 PROCEDURE — 97140 MANUAL THERAPY 1/> REGIONS: CPT

## 2022-12-29 PROCEDURE — 97110 THERAPEUTIC EXERCISES: CPT

## 2022-12-29 NOTE — PROGRESS NOTES
PT DAILY TREATMENT NOTE     Patient Name: Cammy Vazquez  Date:2022  : 1980  [x]  Patient  Verified  Payor: [address] [place] [address] Road / Plan: [address] / Product Type: Managed Care Medicaid /    Treatment Area: Neck pain [M54.2]  Radiculopathy, cervical region [M54.12]   Next MD APPT: In    In time:10:15am    Out time: 11:03am  Total Treatment Time (min): 48  Total Timed codes (Min): 48  Visit #:     SUBJECTIVE    Any medication changes, allergies to medications, adverse drug reactions, diagnosis change, or new procedure performed?:   [x] No    [] Yes (see summary sheet for update)    Pain Level (0-10 scale) pre treatment: 0/10    Pain Level (0-10 scale) post treatment: 0/10    Subjective functional status/changes:   [] No changes reported  Reports no NT today but index and pinky still \"comes and goes\"      OBJECTIVE  Modality rationale: decrease pain and increase tissue extensibility to improve the patients ability to move her head without neck pain      Min Type Additional Details       []  Ice     [x]  Heat   []  Ice massage Position: supine   Location:neck      [] Skin assessment post-treatment:   [x]intact  []redness- no adverse reaction   []redness - adverse reaction:      38 min Therapeutic Exercise:  [x] See flow sheet : 3 way ISOmetric cs retraction,   Rationale: increase ROM to improve the patients ability to perform daily tasks without neck pain or R hand parathesia     10 min Manual Therapy: suboccipital release, cs distraction [id number], SCM manual stretch   Rationale: decrease pain, increase ROM, increase tissue extensibility, and decrease trigger points to improve the patients ability to perform daily tasks without neck pain or R hand parathesia       With   [] TE   [] TA   [] Neuro   [] SC   [] other: Patient Education: [] Review HEP    [] Progressed/Changed HEP based on:   [] positioning   [] body mechanics   [] transfers   [] Use of heat/ice    [] other: Other Objective/Functional Measures:      ASSESSMENT/Changes in Function:   Pt tolerated TE without increase in pain. Pt was able to tolerate added scapular TE without pain increase. During lateral cs retraction pt noted some lightheaded symptoms to which subsided after a min of rest and was able to complete exercise. During MT distraction to occipital and cs, noted tightness on SCM. Pt noted improvement of tightness after STM to SCM. Patient will continue to benefit from skilled PT services to modify and progress therapeutic interventions, address functional mobility deficits, address ROM deficits, address strength deficits, analyze and address soft tissue restrictions, analyze and cue movement patterns, and assess and modify postural abnormalities to attain remaining goals. GOALS/Progress towards goals:  Patient Goal (s): learn what to do to prevent pain from returning.     [] Met [] Not met [] Partially met     Short Term Goals: To be accomplished in 4-6 treatments. Indep with a HEP to assist in rehab progression. [x] Met [] Not met [] Partially met    5-10 deg improvement in cervical lat flexion and rotation to improve neck mobility. [] Met [] Not met [] Partially met   Pt will report no sxs into the R UE for centralization of neck pain. [] Met [] Not met [] Partially met      Long Term Goals: To be accomplished in 8-12 treatments. Pt will be able to perform daily activities without R neck/UE sxs for return to PLOF. [] Met [] Not met [] Partially met      Treatment Plan may include any combination of the following: Therapeutic exercise, Neuromuscular reeducation, Manual therapy, Therapeutic activity, Electric stim unattended , Ultrasound, and Mechanical traction    Frequency / Duration: Patient to be seen 1-2 times per week for 8-12 treatments.     Certification Period: 11/16/22 - 2/20/23    PLAN  [x]  Continue plan of care  [x]  Upgrade activities as tolerated       [x]  Update interventions per flow sheet       []  Discharge due to:  []  Other:     Preethi Castillo, PTA 12/29/2022

## 2023-01-04 ENCOUNTER — HOSPITAL ENCOUNTER (OUTPATIENT)
Dept: PHYSICAL THERAPY | Age: 43
End: 2023-01-04
Payer: COMMERCIAL

## 2023-01-04 PROCEDURE — 97140 MANUAL THERAPY 1/> REGIONS: CPT

## 2023-01-04 PROCEDURE — 97110 THERAPEUTIC EXERCISES: CPT

## 2023-01-04 NOTE — PROGRESS NOTES
PT DAILY TREATMENT NOTE     Patient Name: Alina Hall  Date:2023  : 1980  [x]  Patient  Verified  Payor: [address] [place] [address] Road / Plan: [address] / Product Type: Managed Care Medicaid /    Treatment Area: Neck pain [M54.2]  Radiculopathy, cervical region [M54.12]   Next MD APPT: In    In time: 9:45am    Out time: 10:40am  Total Treatment Time (min): 55  Total Timed codes (Min): 45  Visit #: 10/8-12    SUBJECTIVE    Any medication changes, allergies to medications, adverse drug reactions, diagnosis change, or new procedure performed?:   [x] No    [] Yes (see summary sheet for update)    Pain Level (0-10 scale) pre treatment: 5/10  N&T in R hand, 3/10 c/s R>L  Pain Level (0-10 scale) post treatment: 1/10 both areas    Subjective functional status/changes:   [] No changes reported  Pt reports increased N&T in the R hand this date and R side c/s pain. Reports she has been packing things at home and increased stress.        OBJECTIVE  Modality rationale: decrease pain and increase tissue extensibility to improve the patients ability to move her head without neck pain      Min Type Additional Details      10 []  Ice     [x]  Heat   []  Ice massage Position: supine   Location:neck      [] Skin assessment post-treatment:   [x]intact  []redness- no adverse reaction   []redness - adverse reaction:      35 min Therapeutic Exercise:  [x] See flow sheet : 3 way ISOmetric cs retraction,   Rationale: increase ROM to improve the patients ability to perform daily tasks without neck pain or R hand parathesia     10 min Manual Therapy: suboccipital release, cs distraction [id number], SCM manual stretch, manual pec stretching   Rationale: decrease pain, increase ROM, increase tissue extensibility, and decrease trigger points to improve the patients ability to perform daily tasks without neck pain or R hand parathesia       With   [x] TE   [] TA   [] Neuro   [] SC   [] other: Patient Education: [] Review HEP    [] Progressed/Changed HEP based on:   [] positioning   [] body mechanics   [] transfers   [] Use of heat/ice    [] other:          Other Objective/Functional Measures:      ASSESSMENT/Changes in Function:   Pt tolerated session well with no exacerbation of symptoms. Continues to present with hypertonicity of SCM and upper traps addressed with manual tx. Pt with reduction in pain and radicular symptoms in R hand post session. Plan to continue per POC as tolerated. Patient will continue to benefit from skilled PT services to modify and progress therapeutic interventions, address functional mobility deficits, address ROM deficits, address strength deficits, analyze and address soft tissue restrictions, analyze and cue movement patterns, and assess and modify postural abnormalities to attain remaining goals. GOALS/Progress towards goals:  Patient Goal (s): learn what to do to prevent pain from returning.     [] Met [] Not met [] Partially met     Short Term Goals: To be accomplished in 4-6 treatments. Indep with a HEP to assist in rehab progression. [x] Met [] Not met [] Partially met    5-10 deg improvement in cervical lat flexion and rotation to improve neck mobility. [] Met [] Not met [] Partially met   Pt will report no sxs into the R UE for centralization of neck pain. [] Met [] Not met [] Partially met      Long Term Goals: To be accomplished in 8-12 treatments. Pt will be able to perform daily activities without R neck/UE sxs for return to PLOF. [] Met [] Not met [] Partially met      Treatment Plan may include any combination of the following: Therapeutic exercise, Neuromuscular reeducation, Manual therapy, Therapeutic activity, Electric stim unattended , Ultrasound, and Mechanical traction    Frequency / Duration: Patient to be seen 1-2 times per week for 8-12 treatments.     Certification Period: 11/16/22 - 2/20/23    PLAN  [x]  Continue plan of care  [x]  Upgrade activities as tolerated       [x]  Update interventions per flow sheet       []  Discharge due to:  []  Other:     Blanca Reyna, YOLY 1/4/2023

## 2023-01-06 ENCOUNTER — HOSPITAL ENCOUNTER (OUTPATIENT)
Dept: PHYSICAL THERAPY | Age: 43
End: 2023-01-06
Payer: COMMERCIAL

## 2023-01-06 PROCEDURE — 97140 MANUAL THERAPY 1/> REGIONS: CPT

## 2023-01-06 PROCEDURE — 97110 THERAPEUTIC EXERCISES: CPT

## 2023-01-06 NOTE — PROGRESS NOTES
PT DAILY TREATMENT NOTE     Patient Name: Nino Polanco  Date:2023  : 1980  [x]  Patient  Verified  Payor: [address] [place] [address] Road / Plan: [address] / Product Type: Managed Care Medicaid /    Treatment Area: Neck pain [M54.2]  Radiculopathy, cervical region [M54.12]   Next MD APPT: In  or   In time: 11:45am    Out time: 12:31pm  Total Treatment Time (min): 45 min  Total Timed codes (Min): 30 min  Visit #: -    SUBJECTIVE    Any medication changes, allergies to medications, adverse drug reactions, diagnosis change, or new procedure performed?:   [x] No    [] Yes (see summary sheet for update)    Pain Level (0-10 scale) pre treatment: 3/10  N&T in R hand, 3/10 c/s R>L  Pain Level (0-10 scale) post treatment: 0/10 both areas, no radicular symptoms    Subjective functional status/changes:   [] No changes reported  Pt came in 15 minutes late for today's appointment. She stated she still has radicular symptoms that are intermittent, 3 days. She reports the tape did help but she has sensitive skin . \"It makes my skin red  but it helps. \"      OBJECTIVE     NO Estim or US  Modality rationale: decrease pain and increase tissue extensibility to improve the patients ability to move her head without neck pain      Min Type Additional Details      15 []  Ice     [x]  Heat   []  Ice massage Position: supine   Location:neck      [] Skin assessment post-treatment:   [x]intact  []redness- no adverse reaction   []redness - adverse reaction:      15 min Therapeutic Exercise:  [x] See flow sheet : 3 way ISOmetric cs retraction,   Rationale: increase ROM to improve the patients ability to perform daily tasks without neck pain or R hand parathesia     15 min Manual Therapy: suboccipital release, cs distraction SCM manual stretch,    Rationale: decrease pain, increase ROM, increase tissue extensibility, and decrease trigger points to improve the patients ability to perform daily tasks without neck pain or R hand parathesia       With   [x] TE   [] TA   [] Neuro   [] SC   [] other: Patient Education: [] Review HEP    [] Progressed/Changed HEP based on:   [] positioning   [] body mechanics   [] transfers   [] Use of heat/ice    [] other:          Other Objective/Functional Measures:      ASSESSMENT/Changes in Function: Patient reporting manual traction helps decrease radicular symptoms. She is very TTP along R UT and SCM. Applied tape to R UT due to patient reporting relief with first taping. No pain or radicular symptoms post treatment session. Patient will continue to benefit from skilled PT services to modify and progress therapeutic interventions, address functional mobility deficits, address ROM deficits, address strength deficits, analyze and address soft tissue restrictions, analyze and cue movement patterns, and assess and modify postural abnormalities to attain remaining goals. GOALS/Progress towards goals:  Patient Goal (s): learn what to do to prevent pain from returning.     [] Met [] Not met [] Partially met     Short Term Goals: To be accomplished in 4-6 treatments. Indep with a HEP to assist in rehab progression. [x] Met [] Not met [] Partially met    5-10 deg improvement in cervical lat flexion and rotation to improve neck mobility. [] Met [] Not met [] Partially met   Pt will report no sxs into the R UE for centralization of neck pain. [] Met [x] Not met [] Partially met      Long Term Goals: To be accomplished in 8-12 treatments. Pt will be able to perform daily activities without R neck/UE sxs for return to PLOF. [] Met [] Not met [] Partially met      Treatment Plan may include any combination of the following: Therapeutic exercise, Neuromuscular reeducation, Manual therapy, Therapeutic activity, Electric stim unattended , Ultrasound, and Mechanical traction    Frequency / Duration: Patient to be seen 1-2 times per week for 8-12 treatments.     Certification Period: 11/16/22 - 2/20/23    PLAN  [x]  Continue plan of care  [x]  Upgrade activities as tolerated       [x]  Update interventions per flow sheet       []  Discharge due to:  []  Other:     Marifer Green PTA 1/6/2023

## 2023-01-09 ENCOUNTER — APPOINTMENT (OUTPATIENT)
Dept: PHYSICAL THERAPY | Age: 43
End: 2023-01-09
Payer: COMMERCIAL

## 2023-01-09 PROCEDURE — 97012 MECHANICAL TRACTION THERAPY: CPT

## 2023-01-09 PROCEDURE — 97110 THERAPEUTIC EXERCISES: CPT

## 2023-01-09 NOTE — PROGRESS NOTES
PT DAILY TREATMENT NOTE     Patient Name: Felicity Nielsen  Date:2023  : 1980  [x]  Patient  Verified  [facility] / Plan: [address] / Product Type: Managed Care Medicaid /    Treatment Area: Neck pain [M54.2]  Radiculopathy, cervical region [M54.12]   Next MD APPT: In January   In time: 10:42am  Out time: 11:38am  Total Treatment Time (min): 56  Total Timed codes (Min): 36  Visit #:     SUBJECTIVE    Any medication changes, allergies to medications, adverse drug reactions, diagnosis change, or new procedure performed?:   [x] No    [] Yes (see summary sheet for update)    Pain Level (0-10 scale) pre treatment: 3/10  N&T in R hand, 2-3/10 c/s R>L  Pain Level (0-10 scale) post treatment: 1/10 both areas, no radicular symptoms    Subjective functional status/changes:   [] No changes reported  Pt reports 50-75% improvement since starting therapy. Pt reporting soreness in the UT (R>L) and some R hand tingling this morning. Pt states the tingling comes and goes depending on how she is sitting and using the R arm. Pt states she has had some of the tingling in the L hand also. Pt states she has not been consistent with HEP because she is in the process of moving and has a lot going on.         OBJECTIVE     NO Estim or US  Modality rationale: decrease pain and increase tissue extensibility to improve the patients ability to perform daily activities without neck/hand sxs   Min Type Additional Details    [] Estim: []UnAtt   []Att       []TENS instruct                  []IFC  []Premod   []NMES                    []Other:  []w/US   []w/ice   []w/heat  Position:  Location:    []  Ice     []  Heat  []  Ice massage Position:  Location:   20 [x]  Traction: [x] Cervical       []Lumbar                       [] Prone          [x]Supine                       []Intermittent   []Continuous Lbs: 15  [x]w/heat  []W/heat and Estim    []  Ultrasound: []Continuous   [] Pulsed at:                           []1MHz   []3MHz Location:  W/cm2:      [x] Skin assessment post-treatment:  [x]intact  []redness- no adverse reaction       []redness - adverse reaction:      36 min Therapeutic Exercise:  [x] See flow sheet : reassessment    Rationale: increase ROM to improve the patients ability to perform daily tasks without neck pain or R hand parathesia      min Manual Therapy: suboccipital release, cs distraction SCM manual stretch,    Rationale: decrease pain, increase ROM, increase tissue extensibility, and decrease trigger points to improve the patients ability to perform daily tasks without neck pain or R hand parathesia       With   [x] TE   [] TA   [] Neuro   [] SC   [] other: Patient Education: [] Review HEP    [] Progressed/Changed HEP based on:   [] positioning   [] body mechanics   [] transfers   [] Use of heat/ice    [] other:          Other Objective/Functional Measures:  Spine: *normal values in ()  CERVICAL                                                ROM                                  Strength        Flexion  48    Extension 54    Protraction       Retraction           R L R L   Lateral Flexion (45) 34  P!  40     Rotation (90) 67 70     Additional comments: No c/o parathesia in hands with cervical ROM       Strength: L 83 lbs, R 55 lbs       After traction: L 64 lbs, R 83 lbs (no parathesia)      Special Tests:   +L spurling test with numbness into the L hand   +median nerve test jesus  +R tinnel test elbow        ASSESSMENT/Changes in Function: Pt responded well to tx mechanical traction with less neck pain and significant improvement in R  strength. Pt continues to have signs of neural tension in jesus UEs (R>L). Also noted lower cervical myofascial tightness. Pt will benefit from continued PT services for further centralization of sxs and pain reduction.   She has Pt will benefit from skilled services to improve performance with core stabilization and posture, spine flexibility and strength, increased awareness of neuromuscular control of cervical and scapular muscle activation, and to address impairments in order to meet functional goals. GOALS/Progress towards goals:  Patient Goal (s): learn what to do to prevent pain from returning.     [] Met [] Not met [x] Partially met     Short Term Goals: To be accomplished in 4-6 treatments. Indep with a HEP to assist in rehab progression. [x] Met [] Not met [] Partially met    5-10 deg improvement in cervical lat flexion and rotation to improve neck mobility. [] Met [] Not met [x] Partially met 1/9 met for lat flexion and improved with rotation   Pt will report no sxs into the R UE for centralization of neck pain. [] Met [] Not met [x] Partially met  1/9 less but still present     Long Term Goals: To be accomplished in 8-12 treatments. Pt will be able to perform daily activities without R neck/UE sxs for return to PLOF. [] Met [] Not met [x] Partially met  1/9 50% improved    Treatment Plan may include any combination of the following: Therapeutic exercise, Neuromuscular reeducation, Manual therapy, Therapeutic activity, Electric stim unattended , Ultrasound, and Mechanical traction    Frequency / Duration: Patient to be seen 1-2 times per week for 8-12 treatments.     Certification Period: 11/16/22 - 2/20/23    PLAN  [x]  Continue plan of care  [x]  Upgrade activities as tolerated       [x]  Update interventions per flow sheet       []  Discharge due to:  []  Other:     Brandt Horne, PT, DPT 1/9/2023

## 2023-01-09 NOTE — THERAPY RECERTIFICATION
[address] [address] [facility] [address] [facility]  Ph: [phone]    Fax: [phone]  Plan of Care  Name: Ute Beavers  : 1980   MD: Conrado Councilman, MD     Medical/Treatment Diagnosis: Neck pain [M54.2]  Radiculopathy, cervical region [M54.12]     Problem List/Impairments: pain affecting function, decrease ROM, decrease strength, decrease ADL/ functional abilitiies, decrease activity tolerance, and decrease flexibility/ joint mobility    Start of Care: 22   Visits from Start of Care: 12  Missed Visits: -  Certification Period: 1/10/23 - 3/10/23    Frequency/Duration: 2 times a week for 8 treatments   Treatment Plan may include any combination of the following: Therapeutic exercise, Neuromuscular reeducation, Manual therapy, Therapeutic activity, Self care/home management, Electric stim unattended , Ultrasound, and Mechanical traction  [x]  Plan of care has been reviewed with PTA. Patient/ Caregiver education and instruction: activity modification and exercises    Summary of Care/Goals:    ASSESSMENT/Changes in Function: Pt responded well to tx mechanical traction with less neck pain and significant improvement in R  strength. Pt continues to have signs of neural tension in jesus UEs (R>L). Also noted lower cervical myofascial tightness. Pt will benefit from continued PT services for further centralization of sxs and pain reduction. She has Pt will benefit from skilled services to improve performance with core stabilization and posture, spine flexibility and strength, increased awareness of neuromuscular control of cervical and scapular muscle activation, and to address impairments in order to meet functional goals. GOALS/Progress towards goals:  Patient Goal (s): learn what to do to prevent pain from returning.     [] Met [] Not met [x] Partially met      Short Term Goals: To be accomplished in 4-6 treatments.   Indep with a HEP to assist in rehab progression. [x] Met [] Not met [] Partially met    5-10 deg improvement in cervical lat flexion and rotation to improve neck mobility. [] Met [] Not met [x] Partially met 1/9 met for lat flexion and improved with rotation   Pt will report no sxs into the R UE for centralization of neck pain. [] Met [] Not met [x] Partially met  1/9 less but still present      Long Term Goals: To be accomplished in 8-12 treatments. Pt will be able to perform daily activities without R neck/UE sxs for return to PLOF. [] Met [] Not met [x] Partially met  1/9 50% improved    __________________________________________      SUBJECTIVE     Any medication changes, allergies to medications, adverse drug reactions, diagnosis change, or new procedure performed?:   [x] No    [] Yes (see summary sheet for update)     Pain Level (0-10 scale) pre treatment: 3/10  N&T in R hand, 2-3/10 c/s  Pain Level (0-10 scale) post treatment: 1/10 both areas, no radicular symptoms     Subjective functional status/changes:   [] No changes reported  Pt reports 50-75% improvement since starting therapy. Pt reporting soreness in the UT (R>L) and some R hand tingling this morning. Pt states the tingling comes and goes depending on how she is sitting and using the R arm. Pt states she has had some of the tingling in the L hand also. Pt states she has not been consistent with HEP because she is in the process of moving and has a lot going on.           OBJECTIVE                                              Other Objective/Functional Measures:  Spine: *normal values in ()  CERVICAL                                                ROM                                  Strength        Flexion  48     Extension 54     Protraction       Retraction            R L R L   Lateral Flexion (45) 34  P!  40       Rotation (90) 67 70       Additional comments: No c/o parathesia in hands with cervical ROM       Strength: L 83 lbs, R 55 lbs       After traction: L 64 lbs, R 83 lbs (no parathesia)      Special Tests:   +L spurling test with numbness into the L hand   +median nerve test jesus  +R tinnel test elbow         Ampac Score:  0%  Recommendations: continue current POC - trial mechanical traction     Kalli Jacob Sigifredo, PT, DPT 1/9/2023     Retain this original for your records. If you are unable to process this request in 24 hours, please contact our office.   ________________________________________________________________________  NOTE TO PHYSICIAN:  Please complete the following and fax to: Kellen Li :  Fax: [phone]   ____ I have read the above report and request that my patient continue therapy. ____ I have read the above report and request that my patient continue therapy with the following changes/special instructions:    ____ I have read the above report and request that my patient be discharged from therapy.     Physician's Signature:_______________________________________________ Date:_____________Time:____________      Issac Lopez MD

## 2023-01-19 ENCOUNTER — HOSPITAL ENCOUNTER (OUTPATIENT)
Dept: PHYSICAL THERAPY | Age: 43
Discharge: HOME | End: 2023-01-19
Payer: COMMERCIAL

## 2023-01-19 PROCEDURE — 97110 THERAPEUTIC EXERCISES: CPT

## 2023-01-19 NOTE — PROGRESS NOTES
PT DAILY TREATMENT NOTE     Patient Name: Jeimy Alfred  Date:2023  : 1980  [x]  Patient  Verified  Payor: [address] [place] [address] Road / Plan: [address] / Product Type: Managed Care Medicaid /    Treatment Area: Neck pain [M54.2]  Radiculopathy, cervical region [M54.12]   Next MD APPT: In January   In time: 10:00 am  Out time: 10:20 am  Total Treatment Time (min): 15 min  Total Timed codes (Min): 15 min  Visit #: 1/10     SUBJECTIVE    Any medication changes, allergies to medications, adverse drug reactions, diagnosis change, or new procedure performed?:   [x] No    [] Yes (see summary sheet for update)    Pain Level (0-10 scale) pre treatment: 0/10 pain  N/T in both hands  Pain Level (0-10 scale) post treatment: 0/10 N/T in both hands    Subjective functional status/changes:   [] No changes reported  Pt stated she did not know they changed her appointment times. \"I can't stay because I have another appointment at 10:30\" Patient stated she is moving and has not had time to listen to her voicemail. \"I was going to leave but thought I should stay. \" Patient reports she has no pain but has N/T in both hands. \"I don't know if that is from moving or not.  \"        OBJECTIVE     NO Estim or US  Modality rationale: decrease pain and increase tissue extensibility to improve the patients ability to perform daily activities without neck/hand sxs   Min Type Additional Details    [] Estim: []UnAtt   []Att       []TENS instruct                  []IFC  []Premod   []NMES                    []Other:  []w/US   []w/ice   []w/heat  Position:  Location:    []  Ice     []  Heat  []  Ice massage Position:  Location:    []  Traction: [] Cervical       []Lumbar                       [] Prone          []Supine                       []Intermittent   []Continuous Lbs: 15  []w/heat  []W/heat and Estim    []  Ultrasound: []Continuous   [] Pulsed at:                           []1MHz   []3MHz Location:  W/cm2: [] Skin assessment post-treatment:  []intact  []redness- no adverse reaction       []redness - adverse reaction:      15 min Therapeutic Exercise:  [x] See flow sheet : reassessment    Rationale: increase ROM to improve the patients ability to perform daily tasks without neck pain or R hand parathesia      min Manual Therapy: suboccipital release, cs distraction SCM manual stretch,    Rationale: decrease pain, increase ROM, increase tissue extensibility, and decrease trigger points to improve the patients ability to perform daily tasks without neck pain or R hand parathesia       With   [x] TE   [] TA   [] Neuro   [] SC   [] other: Patient Education: [] Review HEP    [] Progressed/Changed HEP based on:   [] positioning   [] body mechanics   [] transfers   [] Use of heat/ice    [] other:          Other Objective/Functional Measures:  Treatment modified and cut short per patient request.       ASSESSMENT/Changes in Function: Pt's treatment time was changed and patient was not aware. She is in the process of moving and did not receive the message. She has another appointment that she has to go to and requested to leave. She is complaining of N/T in both hands now and unsure if due to moving. No changes pre or post treatment session. Pt will benefit from continued PT services for further centralization of sxs and pain reduction. She has Pt will benefit from skilled services to improve performance with core stabilization and posture, spine flexibility and strength, increased awareness of neuromuscular control of cervical and scapular muscle activation, and to address impairments in order to meet functional goals. GOALS/Progress towards goals:  Patient Goal (s): learn what to do to prevent pain from returning.     [] Met [] Not met [x] Partially met     Short Term Goals: To be accomplished in 4-6 treatments. Indep with a HEP to assist in rehab progression.  [x] Met [] Not met [] Partially met    5-10 deg improvement in cervical lat flexion and rotation to improve neck mobility. [] Met [] Not met [x] Partially met 1/9 met for lat flexion and improved with rotation   Pt will report no sxs into the R UE for centralization of neck pain. [] Met [] Not met [x] Partially met  1/9 less but still present     Long Term Goals: To be accomplished in 8-12 treatments. Pt will be able to perform daily activities without R neck/UE sxs for return to PLOF. [] Met [] Not met [x] Partially met  1/9 50% improved    Treatment Plan may include any combination of the following: Therapeutic exercise, Neuromuscular reeducation, Manual therapy, Therapeutic activity, Electric stim unattended , Ultrasound, and Mechanical traction    Frequency / Duration: Patient to be seen 1-2 times per week for 8-12 treatments.     Certification Period: 11/16/22 - 2/20/23    PLAN  [x]  Continue plan of care  [x]  Upgrade activities as tolerated       [x]  Update interventions per flow sheet       []  Discharge due to:  []  Other:     Raffy Patel PTA 1/19/2023

## 2023-01-24 ENCOUNTER — APPOINTMENT (OUTPATIENT)
Dept: PHYSICAL THERAPY | Age: 43
End: 2023-01-24
Payer: COMMERCIAL

## 2023-01-26 ENCOUNTER — APPOINTMENT (OUTPATIENT)
Dept: PHYSICAL THERAPY | Age: 43
End: 2023-01-26
Payer: COMMERCIAL

## 2023-02-07 ENCOUNTER — APPOINTMENT (OUTPATIENT)
Dept: PHYSICAL THERAPY | Age: 43
End: 2023-02-07
Payer: COMMERCIAL

## 2023-02-09 ENCOUNTER — HOSPITAL ENCOUNTER (OUTPATIENT)
Dept: PHYSICAL THERAPY | Age: 43
Discharge: HOME | End: 2023-02-09
Payer: COMMERCIAL

## 2023-02-09 PROCEDURE — 97140 MANUAL THERAPY 1/> REGIONS: CPT

## 2023-02-09 PROCEDURE — 97110 THERAPEUTIC EXERCISES: CPT

## 2023-02-09 NOTE — PROGRESS NOTES
PT DAILY TREATMENT NOTE     Patient Name: Verenice Thorne  Date:2023  : 1980  [x]  Patient  Verified  Payor: [address] [place] [address] Road / Plan: [address] / Product Type: Managed Care Medicaid /    Treatment Area: Neck pain [M54.2]  Radiculopathy, cervical region [M54.12]   Next MD APPT: In January   In time: 10:00 am  Out time: 10:50 am  Total Treatment Time (min): 50 min  Total Timed codes (Min): 50 min  Visit #: 210     SUBJECTIVE    Any medication changes, allergies to medications, adverse drug reactions, diagnosis change, or new procedure performed?:   [x] No    [] Yes (see summary sheet for update)    Pain Level (0-10 scale) pre treatment: 4/10 pain ,  9/10 N/T in both hands  Pain Level (0-10 scale) post treatment: 3/10 , no N/T in both hands    Subjective functional status/changes:   [] No changes reported  Pt returns after being absent for over 1 month. Patient stated she has been under a lot of stress and has been moving. She has not been seen in therapy since last reassessment. She saw MD  and he wanted her to continue for therapy.  She states she is now having numbness and tingling down bilateral UE.    OBJECTIVE     NO Estim or US      35 min Therapeutic Exercise:  [x] See flow sheet : reassessment    Rationale: increase ROM to improve the patients ability to perform daily tasks without neck pain or R hand parathesia     15 min Manual Therapy: suboccipital release, cs distraction SCM manual stretch, Taped R UT   Rationale: decrease pain, increase ROM, increase tissue extensibility, and decrease trigger points to improve the patients ability to perform daily tasks without neck pain or R hand parathesia       With   [x] TE   [] TA   [] Neuro   [] SC   [] other: Patient Education: [] Review HEP    [] Progressed/Changed HEP based on:   [] positioning   [] body mechanics   [] transfers   [] Use of heat/ice    [x] other: Reassessed         Other Objective/Functional Measures:  Spine:     CERVICAL                                                ROM                                  Strength   Flexion  60    Extension 60       R L R L   Lateral Flexion 45 45     Rotation 60 45     Additional comments: Pain on R      Am-Pac: 0%    ASSESSMENT/Changes in Function: She is complaining of N/T in both hands now and unsure if due to moving. Patient returns to therapy after 1 month and after last reassessment. No changes with AM-Pac rating of 0 % or goals. She does get relief with MT and taping of R UT. She did have a follow up appointment with MD on 2/6 and he wanted her to continue with therapy. No N/T following manual traction and taping. Patient complaining of cervical pain R > L. Pt will benefit from continued PT services for further centralization of sxs and pain reduction. She has Pt will benefit from skilled services to improve performance with core stabilization and posture, spine flexibility and strength, increased awareness of neuromuscular control of cervical and scapular muscle activation, and to address impairments in order to meet functional goals. GOALS/Progress towards goals:  Patient Goal (s): learn what to do to prevent pain from returning.     [] Met [] Not met [x] Partially met     Short Term Goals: To be accomplished in 4-6 treatments. Indep with a HEP to assist in rehab progression. [x] Met [] Not met [] Partially met    5-10 deg improvement in cervical lat flexion and rotation to improve neck mobility. [] Met [] Not met [x] Partially met 1/9 met for lat flexion and improved with rotation   Pt will report no sxs into the R UE for centralization of neck pain. [] Met [x] Not met [] Partially met  1/9 less but still present 2/9 now in both UE    Long Term Goals: To be accomplished in 8-12 treatments. Pt will be able to perform daily activities without R neck/UE sxs for return to PLOF.  [] Met [] Not met [x] Partially met  1/9 50% improved    Treatment Plan may include any combination of the following: Therapeutic exercise, Neuromuscular reeducation, Manual therapy, Therapeutic activity, Electric stim unattended , Ultrasound, and Mechanical traction    Frequency / Duration: Patient to be seen 1-2 times per week for 8-12 treatments.     Certification Period: 11/16/22 - 2/20/23    PLAN  [x]  Continue plan of care  [x]  Upgrade activities as tolerated       [x]  Update interventions per flow sheet       []  Discharge due to:  []  Other:     Anisa Lamb, PTA 2/9/2023

## 2023-02-10 NOTE — PROGRESS NOTES
[address] [address] [facility] [address] [facility], [address] [place] [address]  Ph: [phone]    Fax: [phone]  Therapy Progress Report  Name: Edmundo Primrose  : 1980   MD: Marcy Tomas MD     Medical Diagnosis: Neck pain [M54.2]  Radiculopathy, cervical region [M54.12]  Start of Care: 22    Visits from Start of Care: 14     Missed Visits: 2  Certification Period: 1/10/23 - 3/10/23    Frequency/Duration: 2 times a week for 8 treatments     Summary of Care/Goals:  ASSESSMENT/Changes in Function:   Pt has only been seen two times since her last assessment. She has missed time due to personal reasons. She is now experiencing N/T in both hands but unsure if this is due to moving or something else. She has mild cervical ROM limitations but significant soft tissue restrictions. She does get relief with MT and taping of R UT. No changes with AM-Pac rating of 0 % or goals. She did have a follow up appointment with MD on  and he wanted her to continue with therapy. Pt will benefit from continued PT services for further centralization of sxs and pain reduction. She has Pt will benefit from skilled services to improve performance with core stabilization and posture, spine flexibility and strength, increased awareness of neuromuscular control of cervical and scapular muscle activation, and to address impairments in order to meet functional goals. GOALS/Progress towards goals:  Patient Goal (s): learn what to do to prevent pain from returning.     [] Met [] Not met [x] Partially met      Short Term Goals: To be accomplished in 4-6 treatments. Indep with a HEP to assist in rehab progression. [x] Met [] Not met [] Partially met    5-10 deg improvement in cervical lat flexion and rotation to improve neck mobility.  [] Met [] Not met [x] Partially met 1 met for lat flexion and improved with rotation   Pt will report no sxs into the R UE for centralization of neck pain. [] Met [x] Not met [] Partially met 2/9 now in both UE     Long Term Goals: To be accomplished in 8-12 treatments. Pt will be able to perform daily activities without R neck/UE sxs for return to PLOF. [] Met [] Not met [x] Partially met  1/9 50% improved    __________________________________________       SUBJECTIVE     Any medication changes, allergies to medications, adverse drug reactions, diagnosis change, or new procedure performed?:   [x] No    [] Yes (see summary sheet for update)     Pain Level (0-10 scale) pre treatment: 4/10 pain ,  9/10 N/T in both hands  Pain Level (0-10 scale) post treatment: 3/10 , no N/T in both hands     Subjective functional status/changes:   [] No changes reported  Pt returns after being absent for over 1 month. Patient stated she has been under a lot of stress and has been moving. She has not been seen in therapy since last reassessment. She saw MD 2/6 and he wanted her to continue for therapy. She states she is now having numbness and tingling down bilateral UE.     OBJECTIVE                               Other Objective/Functional Measures:  Spine:      CERVICAL                                                ROM                                  Strength        Flexion  60     Extension 60          R L R L   Lateral Flexion 45 45       Rotation 60 45       Additional comments: Pain on R      Am-Pac: 0%  Recommendations: continue current POC     A physical therapist assistant contributed to this documentation. [x]  Plan of care has been reviewed with PTA. Kalli Arnold, PT, DPT 2/10/2023     ________________________________________________________________________     Please retain this original for your records.

## 2023-02-14 ENCOUNTER — HOSPITAL ENCOUNTER (OUTPATIENT)
Dept: PHYSICAL THERAPY | Age: 43
End: 2023-02-14
Payer: COMMERCIAL

## 2023-02-14 PROCEDURE — 97110 THERAPEUTIC EXERCISES: CPT

## 2023-02-14 PROCEDURE — 97140 MANUAL THERAPY 1/> REGIONS: CPT

## 2023-02-14 NOTE — PROGRESS NOTES
PT DAILY TREATMENT NOTE     Patient Name: Joel Ordonez  Date:2023  : 1980  [x]  Patient  Verified  Payor: [address] [place] [address] Road / Plan: [address] / Product Type: Managed Care Medicaid /    Treatment Area: Neck pain [M54.2]  Radiculopathy, cervical region [M54.12]   Next MD APPT: In January   In time: 10:05 am Out time: 10:45 am  Total Treatment Time (min): 40 min  Total Timed codes (Min): 40 min  Visit #: 3/10     SUBJECTIVE    Any medication changes, allergies to medications, adverse drug reactions, diagnosis change, or new procedure performed?:   [x] No    [] Yes (see summary sheet for update)    Pain Level (0-10 scale) pre treatment: 0/10 pain ,  mild/10 N/T in both hands  Pain Level (0-10 scale) post treatment: 0/10 , no N/T in both hands    Subjective functional status/changes:   [] No changes reported  Pt states she does not have any pain but still has N/T. \"It's not going all the way down my arm now. It's stopping at my elbow. \" She also complains of pain to the R side of her jaw. OBJECTIVE     NO Estim or US    30 min Therapeutic Exercise:  [x] See flow sheet : reassessment    Rationale: increase ROM to improve the patients ability to perform daily tasks without neck pain or R hand parathesia     10 min Manual Therapy: suboccipital release, cs distraction SCM manual stretch, Taped R UT   Rationale: decrease pain, increase ROM, increase tissue extensibility, and decrease trigger points to improve the patients ability to perform daily tasks without neck pain or R hand parathesia       With   [x] TE   [] TA   [] Neuro   [] SC   [] other: Patient Education: [] Review HEP    [] Progressed/Changed HEP based on:   [] positioning   [] body mechanics   [] transfers   [] Use of heat/ice    [x] other: Reassessed         Other Objective/Functional Measures:Cont. With exercises per POC      ASSESSMENT/Changes in Function: She is reporting  decrease N/T now stopping at elbow. Complaining more of TMJ  pain today. Patient still requires cues with exercises for proper technique. No pain post treatment session . Pt will benefit from skilled services to improve performance with core stabilization and posture, spine flexibility and strength, increased awareness of neuromuscular control of cervical and scapular muscle activation, and to address impairments in order to meet functional goals. GOALS/Progress towards goals:  Patient Goal (s): learn what to do to prevent pain from returning.     [] Met [] Not met [x] Partially met     Short Term Goals: To be accomplished in 4-6 treatments. Indep with a HEP to assist in rehab progression. [x] Met [] Not met [] Partially met    5-10 deg improvement in cervical lat flexion and rotation to improve neck mobility. [] Met [] Not met [x] Partially met 1/9 met for lat flexion and improved with rotation   Pt will report no sxs into the R UE for centralization of neck pain. [] Met [x] Not met [] Partially met  1/9 less but still present 2/9 now in both UE    Long Term Goals: To be accomplished in 8-12 treatments. Pt will be able to perform daily activities without R neck/UE sxs for return to PLOF. [] Met [] Not met [x] Partially met  1/9 50% improved    Treatment Plan may include any combination of the following: Therapeutic exercise, Neuromuscular reeducation, Manual therapy, Therapeutic activity, Electric stim unattended , Ultrasound, and Mechanical traction    Frequency / Duration: Patient to be seen 1-2 times per week for 8-12 treatments.     Certification Period: 11/16/22 - 2/20/23    PLAN  [x]  Continue plan of care  [x]  Upgrade activities as tolerated       [x]  Update interventions per flow sheet       []  Discharge due to:  []  Other:     Nael Arboleda, PTA 2/14/2023

## 2023-02-16 ENCOUNTER — HOSPITAL ENCOUNTER (OUTPATIENT)
Dept: PHYSICAL THERAPY | Age: 43
End: 2023-02-16
Payer: COMMERCIAL

## 2023-02-16 PROCEDURE — 97140 MANUAL THERAPY 1/> REGIONS: CPT

## 2023-02-16 PROCEDURE — 97110 THERAPEUTIC EXERCISES: CPT

## 2023-02-16 PROCEDURE — 97014 ELECTRIC STIMULATION THERAPY: CPT

## 2023-02-16 NOTE — PROGRESS NOTES
PT DAILY TREATMENT NOTE     Patient Name: Shannan Stephens  Date:2023  : 1980  [x]  Patient  Verified  Payor: [address] [place] [address] Road / Plan: [address] / Product Type: Managed Care Medicaid /    Treatment Area: Neck pain [M54.2]  Radiculopathy, cervical region [M54.12]   Next MD APPT: In January   In time: 10:05 am Out time: 11:05am  Total Treatment Time (min): 60  Total Timed codes (Min): 45  Visit #:      SUBJECTIVE    Any medication changes, allergies to medications, adverse drug reactions, diagnosis change, or new procedure performed?:   [x] No    [] Yes (see summary sheet for update)    Pain Level (0-10 scale) pre treatment: 0/10 pain ,  0/10 N/T in both hands  Pain Level (0-10 scale) post treatment: 0/10 , no N/T in both hands    Subjective functional status/changes:   [] No changes reported  Pt reporting no pain or numbness/tingling upon arrival.  Pt states the tingling will come later in the day. She notices the tingling after working because she is using her hands a lot at work and also if she is sitting with pressure on the elbows.       OBJECTIVE     NO Estim or US  Modality rationale: decrease pain and increase tissue extensibility to improve the patients ability to perform daily activities without neck pain or UE sxs 1     Min Type Additional Details      5 []  Ice     [x]  Heat  []  Ice massage Position: supine   Location: neck      [x] Skin assessment post-treatment:   [x]intact  []redness- no adverse reaction   []redness - adverse reaction:      35 min Therapeutic Exercise:  [x] See flow sheet :    Rationale: increase ROM to improve the patients ability to perform daily tasks without neck pain or R hand parathesia     10 min Manual Therapy: STM/trigger point release cervical paraspinals/UT/scalenes (R>L)   Rationale: decrease pain, increase ROM, increase tissue extensibility, and decrease trigger points to improve the patients ability to perform daily tasks without neck pain or R hand parathesia       With   [x] TE   [] TA   [] Neuro   [] SC   [] other: Patient Education: [x] Review HEP    [x] Progressed/Changed HEP based on: wrist extensor/flexor stretches   [] positioning   [] body mechanics   [] transfers   [] Use of heat/ice    [] other: Reassessed         Other Objective/Functional Measures: Added wrist ext/flexion stretches to assess N/T response. ASSESSMENT/Changes in Function:   Pt responded well to tx with no sxs post session. She did have some parathesia in the R hand after exercises but this eased following MT and heat. Pt with soft tissue tightness mainly along the R side of the neck. Large nodule felt along the R side also. Will continue current POC. Pt will benefit from skilled services to improve performance with core stabilization and posture, spine flexibility and strength, increased awareness of neuromuscular control of cervical and scapular muscle activation, and to address impairments in order to meet functional goals. GOALS/Progress towards goals:  Patient Goal (s): learn what to do to prevent pain from returning.     [] Met [] Not met [x] Partially met     Short Term Goals: To be accomplished in 4-6 treatments. Indep with a HEP to assist in rehab progression. [x] Met [] Not met [] Partially met    5-10 deg improvement in cervical lat flexion and rotation to improve neck mobility. [] Met [] Not met [x] Partially met 1/9 met for lat flexion and improved with rotation   Pt will report no sxs into the R UE for centralization of neck pain. [] Met [x] Not met [] Partially met  1/9 less but still present 2/9 now in both UE    Long Term Goals: To be accomplished in 8-12 treatments. Pt will be able to perform daily activities without R neck/UE sxs for return to PLOF.  [] Met [] Not met [x] Partially met  1/9 50% improved    Treatment Plan may include any combination of the following: Therapeutic exercise, Neuromuscular reeducation, Manual therapy, Therapeutic activity, Electric stim unattended , Ultrasound, and Mechanical traction    Frequency / Duration: Patient to be seen 1-2 times per week for 8-12 treatments.     Certification Period: 11/16/22 - 2/20/23    PLAN  [x]  Continue plan of care  [x]  Upgrade activities as tolerated       [x]  Update interventions per flow sheet       []  Discharge due to:  []  Other:     sAhtyn Velásquez, PT, DPT 2/16/2023

## 2023-04-25 ENCOUNTER — TRANSCRIBE ORDER (OUTPATIENT)
Dept: SCHEDULING | Age: 43
End: 2023-04-25

## 2023-04-25 DIAGNOSIS — Z12.31 SCREENING MAMMOGRAM FOR BREAST CANCER: Primary | ICD-10-CM

## 2023-05-04 ENCOUNTER — HOSPITAL ENCOUNTER (OUTPATIENT)
Dept: MAMMOGRAPHY | Age: 43
Discharge: HOME | End: 2023-05-04
Payer: COMMERCIAL

## 2023-05-04 DIAGNOSIS — Z12.31 SCREENING MAMMOGRAM FOR BREAST CANCER: ICD-10-CM

## 2023-05-04 PROCEDURE — 77063 BREAST TOMOSYNTHESIS BI: CPT

## 2025-04-17 ENCOUNTER — TRANSCRIBE ORDERS (OUTPATIENT)
Facility: HOSPITAL | Age: 45
End: 2025-04-17

## 2025-04-17 DIAGNOSIS — Z12.31 ENCOUNTER FOR SCREENING MAMMOGRAM FOR MALIGNANT NEOPLASM OF BREAST: Primary | ICD-10-CM

## 2025-06-18 ENCOUNTER — HOSPITAL ENCOUNTER (OUTPATIENT)
Facility: HOSPITAL | Age: 45
Discharge: HOME | End: 2025-06-21
Payer: COMMERCIAL

## 2025-06-18 DIAGNOSIS — Z12.31 ENCOUNTER FOR SCREENING MAMMOGRAM FOR MALIGNANT NEOPLASM OF BREAST: ICD-10-CM

## 2025-06-18 PROCEDURE — 77063 BREAST TOMOSYNTHESIS BI: CPT
